# Patient Record
Sex: FEMALE | Race: WHITE | NOT HISPANIC OR LATINO | Employment: STUDENT | URBAN - METROPOLITAN AREA
[De-identification: names, ages, dates, MRNs, and addresses within clinical notes are randomized per-mention and may not be internally consistent; named-entity substitution may affect disease eponyms.]

---

## 2017-06-13 ENCOUNTER — APPOINTMENT (OUTPATIENT)
Dept: GENERAL RADIOLOGY | Facility: HOSPITAL | Age: 52
End: 2017-06-13

## 2017-06-13 ENCOUNTER — HOSPITAL ENCOUNTER (EMERGENCY)
Facility: HOSPITAL | Age: 52
Discharge: HOME | End: 2017-06-13
Attending: EMERGENCY MEDICINE | Admitting: EMERGENCY MEDICINE

## 2017-06-13 VITALS
SYSTOLIC BLOOD PRESSURE: 122 MMHG | BODY MASS INDEX: 23.32 KG/M2 | OXYGEN SATURATION: 97 % | DIASTOLIC BLOOD PRESSURE: 60 MMHG | HEART RATE: 60 BPM | TEMPERATURE: 97.9 F | WEIGHT: 140 LBS | HEIGHT: 65 IN | RESPIRATION RATE: 16 BRPM

## 2017-06-13 DIAGNOSIS — F41.9 ANXIETY: Primary | ICD-10-CM

## 2017-06-13 LAB
ALBUMIN SERPL-MCNC: 4.6 G/DL (ref 3.2–4.8)
ALBUMIN/GLOB SERPL: 1.4 G/DL (ref 1.5–2.5)
ALP SERPL-CCNC: 55 U/L (ref 25–100)
ALT SERPL W P-5'-P-CCNC: 22 U/L (ref 7–40)
ANION GAP SERPL CALCULATED.3IONS-SCNC: 9 MMOL/L (ref 3–11)
AST SERPL-CCNC: 18 U/L (ref 0–33)
BASOPHILS # BLD AUTO: 0.03 10*3/MM3 (ref 0–0.2)
BASOPHILS NFR BLD AUTO: 0.4 % (ref 0–1)
BILIRUB SERPL-MCNC: 0.6 MG/DL (ref 0.3–1.2)
BNP SERPL-MCNC: 30 PG/ML (ref 0–100)
BUN BLD-MCNC: 9 MG/DL (ref 9–23)
BUN/CREAT SERPL: 15 (ref 7–25)
CALCIUM SPEC-SCNC: 9.8 MG/DL (ref 8.7–10.4)
CHLORIDE SERPL-SCNC: 106 MMOL/L (ref 99–109)
CO2 SERPL-SCNC: 24 MMOL/L (ref 20–31)
CREAT BLD-MCNC: 0.6 MG/DL (ref 0.6–1.3)
DEPRECATED RDW RBC AUTO: 45.6 FL (ref 37–54)
EOSINOPHIL # BLD AUTO: 0.06 10*3/MM3 (ref 0.1–0.3)
EOSINOPHIL NFR BLD AUTO: 0.9 % (ref 0–3)
ERYTHROCYTE [DISTWIDTH] IN BLOOD BY AUTOMATED COUNT: 14 % (ref 11.3–14.5)
GFR SERPL CREATININE-BSD FRML MDRD: 106 ML/MIN/1.73
GLOBULIN UR ELPH-MCNC: 3.2 GM/DL
GLUCOSE BLD-MCNC: 89 MG/DL (ref 70–100)
HCT VFR BLD AUTO: 36.6 % (ref 34.5–44)
HGB BLD-MCNC: 12 G/DL (ref 11.5–15.5)
HOLD SPECIMEN: NORMAL
HOLD SPECIMEN: NORMAL
IMM GRANULOCYTES # BLD: 0.02 10*3/MM3 (ref 0–0.03)
IMM GRANULOCYTES NFR BLD: 0.3 % (ref 0–0.6)
LIPASE SERPL-CCNC: 36 U/L (ref 6–51)
LYMPHOCYTES # BLD AUTO: 2.08 10*3/MM3 (ref 0.6–4.8)
LYMPHOCYTES NFR BLD AUTO: 29.9 % (ref 24–44)
MCH RBC QN AUTO: 29.1 PG (ref 27–31)
MCHC RBC AUTO-ENTMCNC: 32.8 G/DL (ref 32–36)
MCV RBC AUTO: 88.8 FL (ref 80–99)
MONOCYTES # BLD AUTO: 0.53 10*3/MM3 (ref 0–1)
MONOCYTES NFR BLD AUTO: 7.6 % (ref 0–12)
NEUTROPHILS # BLD AUTO: 4.23 10*3/MM3 (ref 1.5–8.3)
NEUTROPHILS NFR BLD AUTO: 60.9 % (ref 41–71)
PLATELET # BLD AUTO: 198 10*3/MM3 (ref 150–450)
PMV BLD AUTO: 11 FL (ref 6–12)
POTASSIUM BLD-SCNC: 3.7 MMOL/L (ref 3.5–5.5)
PROT SERPL-MCNC: 7.8 G/DL (ref 5.7–8.2)
RBC # BLD AUTO: 4.12 10*6/MM3 (ref 3.89–5.14)
SODIUM BLD-SCNC: 139 MMOL/L (ref 132–146)
TROPONIN I SERPL-MCNC: 0 NG/ML (ref 0–0.07)
TROPONIN I SERPL-MCNC: 0 NG/ML (ref 0–0.07)
WBC NRBC COR # BLD: 6.95 10*3/MM3 (ref 3.5–10.8)
WHOLE BLOOD HOLD SPECIMEN: NORMAL
WHOLE BLOOD HOLD SPECIMEN: NORMAL

## 2017-06-13 PROCEDURE — 83880 ASSAY OF NATRIURETIC PEPTIDE: CPT | Performed by: EMERGENCY MEDICINE

## 2017-06-13 PROCEDURE — 99283 EMERGENCY DEPT VISIT LOW MDM: CPT

## 2017-06-13 PROCEDURE — 83690 ASSAY OF LIPASE: CPT | Performed by: EMERGENCY MEDICINE

## 2017-06-13 PROCEDURE — 84484 ASSAY OF TROPONIN QUANT: CPT

## 2017-06-13 PROCEDURE — 93005 ELECTROCARDIOGRAM TRACING: CPT

## 2017-06-13 PROCEDURE — 80053 COMPREHEN METABOLIC PANEL: CPT | Performed by: EMERGENCY MEDICINE

## 2017-06-13 PROCEDURE — 71020 HC CHEST PA AND LATERAL: CPT

## 2017-06-13 PROCEDURE — 85025 COMPLETE CBC W/AUTO DIFF WBC: CPT | Performed by: EMERGENCY MEDICINE

## 2017-06-13 RX ORDER — ASPIRIN 81 MG/1
324 TABLET, CHEWABLE ORAL ONCE
Status: DISCONTINUED | OUTPATIENT
Start: 2017-06-13 | End: 2017-06-13

## 2017-06-13 RX ORDER — SODIUM CHLORIDE 0.9 % (FLUSH) 0.9 %
10 SYRINGE (ML) INJECTION AS NEEDED
Status: DISCONTINUED | OUTPATIENT
Start: 2017-06-13 | End: 2017-06-14 | Stop reason: HOSPADM

## 2017-06-14 NOTE — ED PROVIDER NOTES
"Subjective   HPI Comments: Shauna Etienne is a 52 y.o.female who presents to the ED with c/o CP.  Last week the pt developed watery diarrhea, dizziness and intermittent CP that she describes as the feeling like pounding in left chest.   The pain would last for five minutes and then would resolve. Since waking up this morning, she has felt , and after it did not regress she presents to the ED where she states that she has had numbness in her left arm since the pain started today. The pt denies alcohol or tobacco use, N/V, SoA or any other acute sx.    The pt has high stress from online classes.    Hx of GERD, IBS, cholecystectomy in October, 2016, and has diarrhea when she does not take her prescribed medication regularly.    No FHx of heart disease.    No hx of HTN, HLD, DM.    Patient is a 52 y.o. female presenting with chest pain.   History provided by:  Patient  Chest Pain   Pain location:  L chest  Pain quality comment:  \"have a baby in my chest\"  Pain radiates to:  Does not radiate  Onset quality:  Gradual  Duration:  1 week  Timing:  Constant  Progression:  Worsening  Chronicity:  New  Relieved by:  None tried  Worsened by:  Nothing  Ineffective treatments:  None tried  Associated symptoms: dizziness and numbness    Associated symptoms: no cough, no diaphoresis, no fatigue, no fever, no headache, no nausea, no shortness of breath and no vomiting    Associated symptoms comment:  Diarrhea      Review of Systems   Constitutional: Negative for chills, diaphoresis, fatigue and fever.   Respiratory: Negative for cough and shortness of breath.    Cardiovascular: Positive for chest pain.   Gastrointestinal: Negative for diarrhea, nausea and vomiting.   Neurological: Positive for dizziness and numbness. Negative for headaches.   All other systems reviewed and are negative.      Past Medical History:   Diagnosis Date   • GERD (gastroesophageal reflux disease)    • Seasonal allergies        Allergies   Allergen " Reactions   • Hydrocodone Nausea And Vomiting       Past Surgical History:   Procedure Laterality Date   • CHOLECYSTECTOMY N/A 10/20/2016    Procedure: CHOLECYSTECTOMY LAPAROSCOPIC;  Surgeon: Sohail Pulido MD;  Location: [facility];  Service:    • COLONOSCOPY     • ENDOSCOPY     • NASAL SINUS SURGERY         History reviewed. No pertinent family history.    Social History     Social History   • Marital status: Single     Spouse name: N/A   • Number of children: N/A   • Years of education: N/A     Social History Main Topics   • Smoking status: Never Smoker   • Smokeless tobacco: Never Used   • Alcohol use No   • Drug use: No   • Sexual activity: Defer     Other Topics Concern   • None     Social History Narrative         Objective   Physical Exam   Constitutional: She is oriented to person, place, and time. She appears well-developed and well-nourished. No distress.   HENT:   Head: Normocephalic and atraumatic.   Eyes: Conjunctivae are normal. No scleral icterus.   Neck: Normal range of motion. Neck supple.   Cardiovascular: Normal rate, regular rhythm and normal heart sounds.    Pulmonary/Chest: Effort normal and breath sounds normal. No respiratory distress.   Abdominal: Soft. There is no tenderness.   Musculoskeletal: Normal range of motion. She exhibits no edema or tenderness.   No TTP over chest wall   Lymphadenopathy:     She has no cervical adenopathy.   Neurological: She is alert and oriented to person, place, and time.   Skin: Skin is warm and dry. No rash noted. She is not diaphoretic.   Psychiatric: She has a normal mood and affect. Her behavior is normal.   Nursing note and vitals reviewed.      Procedures         ED Course  ED Course     Recent Results (from the past 24 hour(s))   Comprehensive Metabolic Panel    Collection Time: 06/13/17  6:13 PM   Result Value Ref Range    Glucose 89 70 - 100 mg/dL    BUN 9 9 - 23 mg/dL    Creatinine 0.60 0.60 - 1.30 mg/dL    Sodium 139 132 - 146 mmol/L     Potassium 3.7 3.5 - 5.5 mmol/L    Chloride 106 99 - 109 mmol/L    CO2 24.0 20.0 - 31.0 mmol/L    Calcium 9.8 8.7 - 10.4 mg/dL    Total Protein 7.8 5.7 - 8.2 g/dL    Albumin 4.60 3.20 - 4.80 g/dL    ALT (SGPT) 22 7 - 40 U/L    AST (SGOT) 18 0 - 33 U/L    Alkaline Phosphatase 55 25 - 100 U/L    Total Bilirubin 0.6 0.3 - 1.2 mg/dL    eGFR Non African Amer 106 >60 mL/min/1.73    Globulin 3.2 gm/dL    A/G Ratio 1.4 (L) 1.5 - 2.5 g/dL    BUN/Creatinine Ratio 15.0 7.0 - 25.0    Anion Gap 9.0 3.0 - 11.0 mmol/L   Lipase    Collection Time: 06/13/17  6:13 PM   Result Value Ref Range    Lipase 36 6 - 51 U/L   BNP    Collection Time: 06/13/17  6:13 PM   Result Value Ref Range    BNP 30.0 0.0 - 100.0 pg/mL   Light Blue Top    Collection Time: 06/13/17  6:13 PM   Result Value Ref Range    Extra Tube hold for add-on    Green Top (Gel)    Collection Time: 06/13/17  6:13 PM   Result Value Ref Range    Extra Tube Hold for add-ons.    Lavender Top    Collection Time: 06/13/17  6:13 PM   Result Value Ref Range    Extra Tube hold for add-on    Gold Top - SST    Collection Time: 06/13/17  6:13 PM   Result Value Ref Range    Extra Tube Hold for add-ons.    CBC Auto Differential    Collection Time: 06/13/17  6:13 PM   Result Value Ref Range    WBC 6.95 3.50 - 10.80 10*3/mm3    RBC 4.12 3.89 - 5.14 10*6/mm3    Hemoglobin 12.0 11.5 - 15.5 g/dL    Hematocrit 36.6 34.5 - 44.0 %    MCV 88.8 80.0 - 99.0 fL    MCH 29.1 27.0 - 31.0 pg    MCHC 32.8 32.0 - 36.0 g/dL    RDW 14.0 11.3 - 14.5 %    RDW-SD 45.6 37.0 - 54.0 fl    MPV 11.0 6.0 - 12.0 fL    Platelets 198 150 - 450 10*3/mm3    Neutrophil % 60.9 41.0 - 71.0 %    Lymphocyte % 29.9 24.0 - 44.0 %    Monocyte % 7.6 0.0 - 12.0 %    Eosinophil % 0.9 0.0 - 3.0 %    Basophil % 0.4 0.0 - 1.0 %    Immature Grans % 0.3 0.0 - 0.6 %    Neutrophils, Absolute 4.23 1.50 - 8.30 10*3/mm3    Lymphocytes, Absolute 2.08 0.60 - 4.80 10*3/mm3    Monocytes, Absolute 0.53 0.00 - 1.00 10*3/mm3    Eosinophils, Absolute  "0.06 (L) 0.10 - 0.30 10*3/mm3    Basophils, Absolute 0.03 0.00 - 0.20 10*3/mm3    Immature Grans, Absolute 0.02 0.00 - 0.03 10*3/mm3   POC Troponin, Rapid    Collection Time: 06/13/17  6:44 PM   Result Value Ref Range    Troponin I 0.00 0.00 - 0.07 ng/mL   POC Troponin, Rapid    Collection Time: 06/13/17  8:02 PM   Result Value Ref Range    Troponin I 0.00 0.00 - 0.07 ng/mL     Note: In addition to lab results from this visit, the labs listed above may include labs taken at another facility or during a different encounter within the last 24 hours. Please correlate lab times with ED admission and discharge times for further clarification of the services performed during this visit.    XR Chest PA & Lateral   Final Result   Abnormal        Normal chest x-rays.      THIS DOCUMENT HAS BEEN ELECTRONICALLY SIGNED BY DEEPTI SARGENT MD        Vitals:    06/13/17 1758 06/13/17 2149 06/13/17 2231 06/13/17 2244   BP: 110/57 126/68  122/60   BP Location: Left arm      Patient Position: Sitting      Pulse: 71  62 60   Resp: 16   16   Temp: 97.9 °F (36.6 °C)      TempSrc: Oral      SpO2: 99% 100% 95% 97%   Weight: 140 lb (63.5 kg)      Height: 65\" (165.1 cm)        Medications - No data to display  ECG/EMG Results (last 24 hours)     Procedure Component Value Units Date/Time    ECG 12 Lead [[id number]] Collected:  06/13/17 1958     Updated:  06/13/17 2141    Narrative:       Test Reason : 2ND SET  Blood Pressure : **/** mmHG  Vent. Rate : 061 BPM     Atrial Rate : 061 BPM     P-R Int : 144 ms          QRS Dur : 086 ms      QT Int : 382 ms       P-R-T Axes : 081 058 052 degrees     QTc Int : 384 ms    Normal sinus rhythm  Normal ECG  When compared with ECG of 13-JUN-2017 18:07, (Unconfirmed)  No significant change was found  Confirmed by MARGARITA SIMS ([id number]) on 6/13/2017 9:41:20 PM    Referred By: MD ER           Confirmed By:MARGARITA SIMS    ECG 12 Lead [[id number]] Collected:  06/13/17 1807     Updated:  06/13/17 2141    " Narrative:       Test Reason : cp  Blood Pressure : **/** mmHG  Vent. Rate : 073 BPM     Atrial Rate : 073 BPM     P-R Int : 126 ms          QRS Dur : 080 ms      QT Int : 356 ms       P-R-T Axes : 080 067 056 degrees     QTc Int : 392 ms    Normal sinus rhythm  Normal ECG  When compared with ECG of 09-OCT-2016 17:24,  No significant change was found  Confirmed by MARGARITA WALL ([id number]) on 6/13/2017 9:41:31 PM    Referred By: MD ER           Confirmed By:MARGARITA WALL                        MDM  Number of Diagnoses or Management Options  Anxiety: new and requires workup  Diagnosis management comments: No acute abnormalities.     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     Will DC with advise to take regular breaks, and try to do activities that are enjoyable.        Amount and/or Complexity of Data Reviewed  Clinical lab tests: ordered and reviewed  Tests in the radiology section of CPT®: ordered and reviewed  Decide to obtain previous medical records or to obtain history from someone other than the patient: yes  Review and summarize past medical records: yes  Independent visualization of images, tracings, or specimens: yes    Patient Progress  Patient progress: stable      Final diagnoses:   Anxiety       Documentation assistance provided by sussy Rubio.  Information recorded by the desibzaki was done at my direction and has been verified and validated by me.     Pierre Rubio  06/13/17 222       Margarita Wall MD  06/14/17 5242